# Patient Record
Sex: FEMALE | Race: WHITE | NOT HISPANIC OR LATINO | Employment: STUDENT | URBAN - METROPOLITAN AREA
[De-identification: names, ages, dates, MRNs, and addresses within clinical notes are randomized per-mention and may not be internally consistent; named-entity substitution may affect disease eponyms.]

---
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
## 2024-08-05 NOTE — PATIENT INSTRUCTIONS
Preventive Care at the 15 - 18 Year Visit    Growth Percentiles & Measurements   Weight: 0 lbs 0 oz / Patient weight not available. / (Pended)  74 %ile based on CDC (Girls, 2-20 Years) weight-for-age data based on Weight recorded on 8/19/2019.   Length: Data Unavailable / 0 cm (Pended)  85 %ile based on CDC (Girls, 2-20 Years) Stature-for-age data based on Stature recorded on 8/19/2019.   BMI: Body mass index is 21 kg/m  (pended). (Pended)  60 %ile based on CDC (Girls, 2-20 Years) BMI-for-age based on body measurements available as of 8/19/2019.     Next Visit    Continue to see your health care provider every year for preventive care.    Nutrition    It s very important to eat breakfast. This will help you make it through the morning.    Sit down with your family for a meal on a regular basis.    Eat healthy meals and snacks, including fruits and vegetables. Avoid salty and sugary snack foods.    Be sure to eat foods that are high in calcium and iron.    Avoid or limit caffeine (often found in soda pop).    Sleeping    Your body needs about 9 hours of sleep each night.    Keep screens (TV, computer, and video) out of the bedroom / sleeping area.  They can lead to poor sleep habits and increased obesity.    Health    Limit TV, computer and video time.    Set a goal to be physically fit.  Do some form of exercise every day.  It can be an active sport like skating, running, swimming, a team sport, etc.    Try to get 30 to 60 minutes of exercise at least three times a week.    Make healthy choices: don t smoke or drink alcohol; don t use drugs.    In your teen years, you can expect . . .    To develop or strengthen hobbies.    To build strong friendships.    To be more responsible for yourself and your actions.    To be more independent.    To set more goals for yourself.    To use words that best express your thoughts and feelings.    To develop self-confidence and a sense of self.    To make choices about your  education and future career.    To see big differences in how you and your friends grow and develop.    To have body odor from perspiration (sweating).  Use underarm deodorant each day.    To have some acne, sometimes or all the time.  (Talk with your doctor or nurse about this.)    Most girls have finished going through puberty by 15 to 16 years. Often, boys are still growing and building muscle mass.    Sexuality    It is normal to have sexual feelings.    Find a supportive person who can answer questions about puberty, sexual development, sex, abstinence (choosing not to have sex), sexually transmitted diseases (STDs) and birth control.    Think about how you can say no to sex.    Safety    Accidents are the greatest threat to your health and life.    Avoid dangerous behaviors and situations.  For example, never drive after drinking or using drugs.  Never get in a car if the  has been drinking or using drugs.    Always wear a seat belt in the car.  When you drive, make it a rule for all passengers to wear seat belts, too.    Stay within the speed limit and avoid distractions.    Practice a fire escape plan at home. Check smoke detector batteries twice a year.    Keep electric items (like blow dryers, razors, curling irons, etc.) away from water.    Wear a helmet and other protective gear when bike riding, skating, skateboarding, etc.    Use sunscreen to reduce your risk of skin cancer.    Learn first aid and CPR (cardiopulmonary resuscitation).    Avoid peers who try to pressure you into risky activities.    Learn skills to manage stress, anger and conflict.    Do not use or carry any kind of weapon.    Find a supportive person (teacher, parent, health provider, counselor) whom you can talk to when you feel sad, angry, lonely or like hurting yourself.    Find help if you are being abused physically or sexually, or if you fear being hurt by others.    As a teenager, you will be given more responsibility for  your health and health care decisions.  While your parent or guardian still has an important role, you will likely start spending some time alone with your health care provider as you get older.  Some teen health issues are actually considered confidential, and are protected by law.  Your health care team will discuss this and what it means with you.  Our goal is for you to become comfortable and confident caring for your own health.  ================================================================   patient

## 2025-07-16 ENCOUNTER — OFFICE VISIT (OUTPATIENT)
Dept: FAMILY MEDICINE | Facility: CLINIC | Age: 20
End: 2025-07-16
Payer: COMMERCIAL

## 2025-07-16 VITALS
DIASTOLIC BLOOD PRESSURE: 66 MMHG | HEART RATE: 68 BPM | BODY MASS INDEX: 24.53 KG/M2 | OXYGEN SATURATION: 97 % | HEIGHT: 67 IN | SYSTOLIC BLOOD PRESSURE: 110 MMHG | RESPIRATION RATE: 16 BRPM | WEIGHT: 156.3 LBS

## 2025-07-16 DIAGNOSIS — Z12.4 CERVICAL CANCER SCREENING: ICD-10-CM

## 2025-07-16 DIAGNOSIS — Z00.00 ROUTINE GENERAL MEDICAL EXAMINATION AT A HEALTH CARE FACILITY: Primary | ICD-10-CM

## 2025-07-16 DIAGNOSIS — R14.0 ABDOMINAL BLOATING: ICD-10-CM

## 2025-07-16 PROCEDURE — 3078F DIAST BP <80 MM HG: CPT | Performed by: NURSE PRACTITIONER

## 2025-07-16 PROCEDURE — 99385 PREV VISIT NEW AGE 18-39: CPT | Performed by: NURSE PRACTITIONER

## 2025-07-16 PROCEDURE — 1126F AMNT PAIN NOTED NONE PRSNT: CPT | Performed by: NURSE PRACTITIONER

## 2025-07-16 PROCEDURE — 3074F SYST BP LT 130 MM HG: CPT | Performed by: NURSE PRACTITIONER

## 2025-07-16 SDOH — HEALTH STABILITY: PHYSICAL HEALTH: ON AVERAGE, HOW MANY DAYS PER WEEK DO YOU ENGAGE IN MODERATE TO STRENUOUS EXERCISE (LIKE A BRISK WALK)?: 4 DAYS

## 2025-07-16 ASSESSMENT — SOCIAL DETERMINANTS OF HEALTH (SDOH): HOW OFTEN DO YOU GET TOGETHER WITH FRIENDS OR RELATIVES?: THREE TIMES A WEEK

## 2025-07-16 ASSESSMENT — PAIN SCALES - GENERAL: PAINLEVEL_OUTOF10: NO PAIN (0)

## 2025-07-16 NOTE — PROGRESS NOTES
Preventive Care Visit  [facility] [place] EMILYMOELIDA Alaniz CNP, Family Medicine  Jul 16, 2025      Assessment & Plan     Routine general medical examination at a health care facility  Reviewed age appropriate screenings and immunizations.  Declines vax today.    Cervical cancer screening  Declines; can schedule appt at her convenience for pap.     Abdominal bloating  Recommend food journal.  Can review FODMAP diet.  Follow-up with GI.       Counseling  Appropriate preventive services were addressed with this patient via screening, questionnaire, or discussion as appropriate for fall prevention, nutrition, physical activity, Tobacco-use cessation, social engagement, weight loss and cognition.  Checklist reviewing preventive services available has been given to the patient.  Reviewed patient's diet, addressing concerns and/or questions.   The patient was instructed to see the dentist every 6 months.     The longitudinal plan of care for the diagnosis(es)/condition(s) as documented were addressed during this visit. Due to the added complexity in care, I will continue to support Michaelle in the subsequent management and with ongoing continuity of care.      Follow-up 1 year annual physical       Subjective   Michaelle is a 20 year old, presenting for the following:  Physical        7/16/2025     9:54 AM   Additional Questions   Roomed by AA   Accompanied by Mom          HPI  Recently moved back to [place] from [place].   Hasn't been to the doctor in a few years.   Over the past 5 years has become sensitive to a lot of foods.   Feels bloated after eating foods.   Did a food sensitivity test.   Trying to cut out foods that were identified as sensitivities; notably dairy and wheat.  Feels better with avoidance of dietary triggers.       Sometimes after showering legs can appear discolored (chronic since young child).   Feet can appear puffy at the end of the day.    Periods regular, monthly.         Advance  Care Planning    Discussed advance care planning with patient; however, patient declined at this time.        7/16/2025   General Health   How would you rate your overall physical health? Good   Feel stress (tense, anxious, or unable to sleep) Only a little   (!) STRESS CONCERN      7/16/2025   Nutrition   Three or more servings of calcium each day? (!) I DON'T KNOW   Diet: Other   If other, please elaborate: dairy-free, wheat-free   How many servings of fruit and vegetables per day? (!) 2-3   How many sweetened beverages each day? 0-1         7/16/2025   Exercise   Days per week of moderate/strenous exercise 4 days         7/16/2025   Social Factors   Frequency of gathering with friends or relatives Three times a week   Worry food won't last until get money to buy more No   Food not last or not have enough money for food? No   Do you have housing? (Housing is defined as stable permanent housing and does not include staying outside in a car, in a tent, in an abandoned building, in an overnight shelter, or couch-surfing.) Yes   Are you worried about losing your housing? No   Lack of transportation? No   Unable to get utilities (heat,electricity)? No         7/16/2025   Dental   Dentist two times every year? (!) NO         Today's PHQ-2 Score:       7/16/2025     9:43 AM   PHQ-2 ( 1999 [employer])   Q1: Little interest or pleasure in doing things 0   Q2: Feeling down, depressed or hopeless 0   PHQ-2 Score 0    Q1: Little interest or pleasure in doing things Not at all   Q2: Feeling down, depressed or hopeless Not at all   PHQ-2 Score 0       Patient-reported           7/16/2025   Substance Use   Alcohol more than 3/day or more than 7/wk Not Applicable   Do you use any other substances recreationally? No     Social History     Tobacco Use    Smoking status: Never    Smokeless tobacco: Never   Vaping Use    Vaping status: Never Used   Substance Use Topics    Alcohol use: No    Drug use: No             7/16/2025   One time  "HIV Screening   Previous HIV test? No         7/16/2025   STI Screening   New sexual partner(s) since last STI/HIV test? No     History of abnormal Pap smear: No - age 21-29 PAP every 3 years recommended             7/16/2025   Contraception/Family Planning   Questions about contraception or family planning No   What are your periods like? Regular        Reviewed and updated as needed this visit by Provider                    Past Medical History:   Diagnosis Date    Lactose intolerance      History reviewed. No pertinent surgical history.  Labs reviewed in EPIC  BP Readings from Last 3 Encounters:   07/16/25 110/66   01/03/22 100/59 (10%, Z = -1.28 /  20%, Z = -0.84)*   12/28/21 111/72 (51%, Z = 0.03 /  74%, Z = 0.64)*     *BP percentiles are based on the 2017 AAP Clinical Practice Guideline for girls    Wt Readings from Last 3 Encounters:   07/16/25 70.9 kg (156 lb 4.8 oz)   12/30/21 60.8 kg (134 lb 0.6 oz) (68%, Z= 0.47)*   12/22/21 64.2 kg (141 lb 8.6 oz) (77%, Z= 0.75)*     * Growth percentiles are based on CDC (Girls, 2-20 Years) data.                  Current Outpatient Medications   Medication Sig Dispense Refill    albuterol (PROAIR HFA/PROVENTIL HFA/VENTOLIN HFA) 108 (90 Base) MCG/ACT inhaler Inhale 2 puffs into the lungs every 6 hours as needed (exercise) (Patient not taking: Reported on 7/16/2025)      L-Lysine 500 MG TABS Take by mouth 2 times daily (Patient not taking: Reported on 7/16/2025)       Allergies   Allergen Reactions    Seasonal Allergies             Objective    Exam  /66 (BP Location: Right arm, Patient Position: Sitting, Cuff Size: Adult Regular)   Pulse 68   Resp 16   Ht 1.702 m (5' 7\")   Wt 70.9 kg (156 lb 4.8 oz)   LMP 06/28/2025 (Exact Date)   SpO2 97%   BMI 24.48 kg/m     Estimated body mass index is 24.48 kg/m  as calculated from the following:    Height as of this encounter: 1.702 m (5' 7\").    Weight as of this encounter: 70.9 kg (156 lb 4.8 oz).    Physical " Exam  GENERAL: alert and no distress  EYES: Eyes grossly normal to inspection, PERRL and conjunctivae and sclerae normal  HENT: ear canals and TM's normal, nose and mouth without ulcers or lesions  NECK: no adenopathy, no asymmetry, masses, or scars  RESP: lungs clear to auscultation - no rales, rhonchi or wheezes  CV: regular rate and rhythm, normal S1 S2, no S3 or S4, no murmur, click or rub, no peripheral edema  ABDOMEN: soft, nontender, no hepatosplenomegaly, no masses and bowel sounds normal  MS: no gross musculoskeletal defects noted, no edema  SKIN: no suspicious lesions or rashes  NEURO: Normal strength and tone, mentation intact and speech normal  PSYCH: mentation appears normal, affect normal/bright        Signed Electronically by: ELIDA Davis CNP

## 2025-07-16 NOTE — PATIENT INSTRUCTIONS
"F Fermentable   O Oligosaccharides Fructans, galacto-oligosaccharides Wheat, barley, rye, onion, humaira, white part of spring onion, garlic, shallots, artichokes, beetroot, fennel, peas, chicory, pistachio, cashews, legumes, lentils, and chickpeas   D Disaccharides Lactose Milk, custard, ice cream, and yogurt   M Monosaccharides \"Free fructose\" (fructose in excess of glucose) Apples, pears, mangoes, cherries, watermelon, asparagus, sugar snap peas, honey, high-fructose corn syrup   A And   P Polyols Sorbitol, mannitol, maltitol, and xylitol Apples, pears, apricots, cherries, nectarines, peaches, plums, watermelon, mushrooms, cauliflower, artificially sweetened chewing gum and confectionery    Patient Education   Preventive Care Advice   This is general advice given by our system to help you stay healthy. However, your care team may have specific advice just for you. Please talk to your care team about your preventive care needs.  Nutrition  Eat 5 or more servings of fruits and vegetables each day.  Try wheat bread, brown rice and whole grain pasta (instead of white bread, rice, and pasta).  Get enough calcium and vitamin D. Check the label on foods and aim for 100% of the RDA (recommended daily allowance).  Lifestyle  Exercise at least 150 minutes each week  (30 minutes a day, 5 days a week).  Do muscle strengthening activities 2 days a week. These help control your weight and prevent disease.  No smoking.  Wear sunscreen to prevent skin cancer.  Have a dental exam and cleaning every 6 months.  Yearly exams  See your health care team every year to talk about:  Any changes in your health.  Any medicines your care team has prescribed.  Preventive care, family planning, and ways to prevent chronic diseases.  Shots (vaccines)   HPV shots (up to age 26), if you've never had them before.  Hepatitis B shots (up to age 59), if you've never had them before.  COVID-19 shot: Get this shot when it's due.  Flu shot: Get a flu shot " every year.  Tetanus shot: Get a tetanus shot every 10 years.  Pneumococcal, hepatitis A, and RSV shots: Ask your care team if you need these based on your risk.  Shingles shot (for age 50 and up)  General health tests  Diabetes screening:  Starting at age 35, Get screened for diabetes at least every 3 years.  If you are younger than age 35, ask your care team if you should be screened for diabetes.  Cholesterol test: At age 39, start having a cholesterol test every 5 years, or more often if advised.  Bone density scan (DEXA): At age 50, ask your care team if you should have this scan for osteoporosis (brittle bones).  Hepatitis C: Get tested at least once in your life.  STIs (sexually transmitted infections)  Before age 24: Ask your care team if you should be screened for STIs.  After age 24: Get screened for STIs if you're at risk. You are at risk for STIs (including HIV) if:  You are sexually active with more than one person.  You don't use condoms every time.  You or a partner was diagnosed with a sexually transmitted infection.  If you are at risk for HIV, ask about PrEP medicine to prevent HIV.  Get tested for HIV at least once in your life, whether you are at risk for HIV or not.  Cancer screening tests  Cervical cancer screening: If you have a cervix, begin getting regular cervical cancer screening tests starting at age 21.  Breast cancer scan (mammogram): If you've ever had breasts, begin having regular mammograms starting at age 40. This is a scan to check for breast cancer.  Colon cancer screening: It is important to start screening for colon cancer at age 45.  Have a colonoscopy test every 10 years (or more often if you're at risk) Or, ask your provider about stool tests like a FIT test every year or Cologuard test every 3 years.  To learn more about your testing options, visit:   .  For help making a decision, visit:   https://bit.ly/we15833.  Prostate cancer screening test: If you have a prostate, ask  your care team if a prostate cancer screening test (PSA) at age 55 is right for you.  Lung cancer screening: If you are a current or former smoker ages 50 to 80, ask your care team if ongoing lung cancer screenings are right for you.  For informational purposes only. Not to replace the advice of your health care provider. Copyright   2023 [facility]. All rights reserved. Clinically reviewed by the [facility] Transitions Program. [employer] 333126 - REV 01/24.